# Patient Record
Sex: MALE | Race: WHITE | Employment: UNEMPLOYED | URBAN - METROPOLITAN AREA
[De-identification: names, ages, dates, MRNs, and addresses within clinical notes are randomized per-mention and may not be internally consistent; named-entity substitution may affect disease eponyms.]

---

## 2021-03-05 ENCOUNTER — APPOINTMENT (OUTPATIENT)
Dept: GENERAL RADIOLOGY | Age: 36
End: 2021-03-05
Payer: COMMERCIAL

## 2021-03-05 ENCOUNTER — HOSPITAL ENCOUNTER (EMERGENCY)
Age: 36
Discharge: HOME | End: 2021-03-05
Attending: EMERGENCY MEDICINE
Payer: COMMERCIAL

## 2021-03-05 VITALS
HEART RATE: 94 BPM | WEIGHT: 170 LBS | RESPIRATION RATE: 18 BRPM | SYSTOLIC BLOOD PRESSURE: 127 MMHG | TEMPERATURE: 97 F | BODY MASS INDEX: 24.39 KG/M2 | DIASTOLIC BLOOD PRESSURE: 94 MMHG | OXYGEN SATURATION: 99 %

## 2021-03-05 DIAGNOSIS — T33.90XA FROSTBITE, INITIAL ENCOUNTER: Primary | ICD-10-CM

## 2021-03-05 DIAGNOSIS — Z51.89 VISIT FOR WOUND CHECK: ICD-10-CM

## 2021-03-05 LAB
ANION GAP SERPL CALCULATED.3IONS-SCNC: 11 MMOL/L (ref 7–16)
BASOPHILS ABSOLUTE: 0.07 E9/L (ref 0–0.2)
BASOPHILS RELATIVE PERCENT: 0.7 % (ref 0–2)
BUN BLDV-MCNC: 15 MG/DL (ref 6–20)
CALCIUM SERPL-MCNC: 9.1 MG/DL (ref 8.6–10.2)
CHLORIDE BLD-SCNC: 105 MMOL/L (ref 98–107)
CO2: 24 MMOL/L (ref 22–29)
CREAT SERPL-MCNC: 1 MG/DL (ref 0.7–1.2)
EOSINOPHILS ABSOLUTE: 0.07 E9/L (ref 0.05–0.5)
EOSINOPHILS RELATIVE PERCENT: 0.7 % (ref 0–6)
GFR AFRICAN AMERICAN: >60
GFR NON-AFRICAN AMERICAN: >60 ML/MIN/1.73
GLUCOSE BLD-MCNC: 86 MG/DL (ref 74–99)
HCT VFR BLD CALC: 34.5 % (ref 37–54)
HEMOGLOBIN: 11.5 G/DL (ref 12.5–16.5)
IMMATURE GRANULOCYTES #: 0.03 E9/L
IMMATURE GRANULOCYTES %: 0.3 % (ref 0–5)
LYMPHOCYTES ABSOLUTE: 2.7 E9/L (ref 1.5–4)
LYMPHOCYTES RELATIVE PERCENT: 26.8 % (ref 20–42)
MCH RBC QN AUTO: 31.9 PG (ref 26–35)
MCHC RBC AUTO-ENTMCNC: 33.3 % (ref 32–34.5)
MCV RBC AUTO: 95.6 FL (ref 80–99.9)
MONOCYTES ABSOLUTE: 1.13 E9/L (ref 0.1–0.95)
MONOCYTES RELATIVE PERCENT: 11.2 % (ref 2–12)
NEUTROPHILS ABSOLUTE: 6.09 E9/L (ref 1.8–7.3)
NEUTROPHILS RELATIVE PERCENT: 60.3 % (ref 43–80)
PDW BLD-RTO: 12.4 FL (ref 11.5–15)
PLATELET # BLD: 379 E9/L (ref 130–450)
PMV BLD AUTO: 10 FL (ref 7–12)
POTASSIUM REFLEX MAGNESIUM: 3.9 MMOL/L (ref 3.5–5)
RBC # BLD: 3.61 E12/L (ref 3.8–5.8)
SODIUM BLD-SCNC: 140 MMOL/L (ref 132–146)
TOTAL CK: 260 U/L (ref 20–200)
WBC # BLD: 10.1 E9/L (ref 4.5–11.5)

## 2021-03-05 PROCEDURE — 73130 X-RAY EXAM OF HAND: CPT

## 2021-03-05 PROCEDURE — 80048 BASIC METABOLIC PNL TOTAL CA: CPT

## 2021-03-05 PROCEDURE — 99285 EMERGENCY DEPT VISIT HI MDM: CPT

## 2021-03-05 PROCEDURE — 82550 ASSAY OF CK (CPK): CPT

## 2021-03-05 PROCEDURE — 6370000000 HC RX 637 (ALT 250 FOR IP): Performed by: EMERGENCY MEDICINE

## 2021-03-05 PROCEDURE — 85025 COMPLETE CBC W/AUTO DIFF WBC: CPT

## 2021-03-05 RX ORDER — DIAPER,BRIEF,INFANT-TODD,DISP
EACH MISCELLANEOUS ONCE
Status: COMPLETED | OUTPATIENT
Start: 2021-03-05 | End: 2021-03-05

## 2021-03-05 RX ORDER — HYDROCODONE BITARTRATE AND ACETAMINOPHEN 5; 325 MG/1; MG/1
1 TABLET ORAL ONCE
Status: COMPLETED | OUTPATIENT
Start: 2021-03-05 | End: 2021-03-05

## 2021-03-05 RX ORDER — GABAPENTIN 300 MG/1
300 CAPSULE ORAL ONCE
Status: COMPLETED | OUTPATIENT
Start: 2021-03-05 | End: 2021-03-05

## 2021-03-05 RX ORDER — BACITRACIN, NEOMYCIN, POLYMYXIN B 400; 3.5; 5 [USP'U]/G; MG/G; [USP'U]/G
OINTMENT TOPICAL
Qty: 30 G | Refills: 0 | Status: SHIPPED | OUTPATIENT
Start: 2021-03-05 | End: 2021-03-15

## 2021-03-05 RX ORDER — GABAPENTIN 100 MG/1
100 CAPSULE ORAL 3 TIMES DAILY
Qty: 21 CAPSULE | Refills: 0 | Status: SHIPPED | OUTPATIENT
Start: 2021-03-05 | End: 2021-03-12

## 2021-03-05 RX ADMIN — HYDROCODONE BITARTRATE AND ACETAMINOPHEN 1 TABLET: 5; 325 TABLET ORAL at 16:30

## 2021-03-05 RX ADMIN — BACITRACIN ZINC: 500 OINTMENT TOPICAL at 16:31

## 2021-03-05 RX ADMIN — GABAPENTIN 300 MG: 300 CAPSULE ORAL at 16:31

## 2021-03-05 ASSESSMENT — PAIN DESCRIPTION - DESCRIPTORS: DESCRIPTORS: SHARP;TINGLING

## 2021-03-05 ASSESSMENT — PAIN DESCRIPTION - ORIENTATION: ORIENTATION: RIGHT;LEFT

## 2021-03-05 ASSESSMENT — PAIN DESCRIPTION - PAIN TYPE: TYPE: ACUTE PAIN

## 2021-03-05 ASSESSMENT — PAIN SCALES - GENERAL: PAINLEVEL_OUTOF10: 10

## 2021-03-05 ASSESSMENT — PAIN DESCRIPTION - LOCATION: LOCATION: HAND

## 2021-03-05 NOTE — ED NOTES
Bilateral hands soak in warm/soapy water. ATB ointment applied w non-stick dressings, then wrapped w cling. Patient tolerated well. Patient then taken to x-ray.      Nomi Bautista RN  03/05/21 [id number]

## 2021-03-05 NOTE — ED NOTES
kin Johnson mgr at [facility], [phone].  Contact for updates and transport      Louis Merino RN  03/05/21 [id number]

## 2021-03-07 ASSESSMENT — ENCOUNTER SYMPTOMS
NAUSEA: 0
EYE REDNESS: 0
VOMITING: 0
ABDOMINAL PAIN: 0
SHORTNESS OF BREATH: 0

## 2021-03-07 NOTE — ED PROVIDER NOTES
Chief complaint: Hand pain      HPI:  3/7/21, Time: 12:23 AM EST    HPI             Caren Mullins is a 38 y.o. male presenting to the ED for hip pain. The history is obtained from the patient as well as the patient's medical record. The patient was recently treated for frostbite at [facility]. The patient was discharged to a [facility] house. The patient states that he was discharged with antibiotic ointment, gauze and was to be on gabapentin and Tylenol for pain. The patient states that he is experiencing extreme pain which she describes a burning sensation of his bilateral fingers. This is currently rated 10/10. Worse with palpation use of his fingers. No alleviating factors. No treatment prior to arrival.  The patient states that he feels this was exacerbated by getting over-the-counter antibiotic ointment from the [facility]. There are no associated fevers, chills, lightheadedness, nasal congestion, chest pain, shortness of breath, cough, nausea, vomiting, abdominal pain, diarrhea, constipation, dysuria or hematuria. The patient has no other stated complaints at this time. ROS:   Review of Systems   Constitutional: Negative for chills and fever. HENT: Negative for congestion. Eyes: Negative for redness. Respiratory: Negative for shortness of breath. Cardiovascular: Negative for chest pain. Gastrointestinal: Negative for abdominal pain, nausea and vomiting. Genitourinary: Negative for dysuria. Musculoskeletal: Negative for arthralgias. Skin: Positive for wound (Bilateral hands). Negative for rash. Neurological: Negative for light-headedness. Psychiatric/Behavioral: Negative for confusion. All other systems reviewed and are negative.      --------------------------------------------- PAST HISTORY ---------------------------------------------  Past Medical History:  has no past medical history on file.     Past Surgical History:  has no past surgical history on file.    Social History:  reports that he has been smoking. He has never used smokeless tobacco. He reports that he does not drink alcohol. Family History: family history is not on file. The patients home medications have been reviewed. Allergies: Sulfa antibiotics    ---------------------------------------------------PHYSICAL EXAM--------------------------------------      Constitutional/General: Alert and oriented x3, well appearing, non toxic in NAD  Head: Normocephalic and atraumatic  Mouth: Oropharynx clear, handling secretions, no trismus  Neck: Supple, full ROM,  Pulmonary: Lungs clear to auscultation bilaterally, no wheezes, rales, or rhonchi. Not in respiratory distress  Cardiovascular:  Regular rate. Regular rhythm. No murmurs  Chest: no chest wall tenderness  Abdomen: Soft. Non tender. Non distended. No rebound, guarding, or rigidity. No pulsatile masses appreciated. Musculoskeletal: Moves all extremities x 4. Warm and well perfused, no cyanosis. The patient does have healing wounds and sloughing of his fingertips diffusely. There is no necrotic areas seen. Skin: warm and dry. The patient does have healing wounds and sloughing of his fingertips diffusely. There is no necrotic areas seen. There is no erythema, warm to touch or cellulitic changes  Neurologic: GCS 15, no gross focal neurologic deficits  Psych: Normal Affect    -------------------------------------------------- RESULTS -------------------------------------------------  I have personally reviewed all laboratory and imaging results for this patient. Results are listed below.      LABS:  Results for orders placed or performed during the hospital encounter of 03/05/21   CBC Auto Differential   Result Value Ref Range    WBC 10.1 4.5 - 11.5 E9/L    RBC 3.61 (L) 3.80 - 5.80 E12/L    Hemoglobin 11.5 (L) 12.5 - 16.5 g/dL    Hematocrit 34.5 (L) 37.0 - 54.0 %    MCV 95.6 80.0 - 99.9 fL    MCH 31.9 26.0 - 35.0 pg    MCHC 33.3 32.0 - 34.5 %    RDW 12.4 11.5 - 15.0 fL    Platelets 922 496 - 868 E9/L    MPV 10.0 7.0 - 12.0 fL    Neutrophils % 60.3 43.0 - 80.0 %    Immature Granulocytes % 0.3 0.0 - 5.0 %    Lymphocytes % 26.8 20.0 - 42.0 %    Monocytes % 11.2 2.0 - 12.0 %    Eosinophils % 0.7 0.0 - 6.0 %    Basophils % 0.7 0.0 - 2.0 %    Neutrophils Absolute 6.09 1.80 - 7.30 E9/L    Immature Granulocytes # 0.03 E9/L    Lymphocytes Absolute 2.70 1.50 - 4.00 E9/L    Monocytes Absolute 1.13 (H) 0.10 - 0.95 E9/L    Eosinophils Absolute 0.07 0.05 - 0.50 E9/L    Basophils Absolute 0.07 0.00 - 0.20 B9/S   Basic Metabolic Panel w/ Reflex to MG   Result Value Ref Range    Sodium 140 132 - 146 mmol/L    Potassium reflex Magnesium 3.9 3.5 - 5.0 mmol/L    Chloride 105 98 - 107 mmol/L    CO2 24 22 - 29 mmol/L    Anion Gap 11 7 - 16 mmol/L    Glucose 86 74 - 99 mg/dL    BUN 15 6 - 20 mg/dL    CREATININE 1.0 0.7 - 1.2 mg/dL    GFR Non-African American >60 >=60 mL/min/1.73    GFR African American >60     Calcium 9.1 8.6 - 10.2 mg/dL   CK   Result Value Ref Range    Total  (H) 20 - 200 U/L       RADIOLOGY:  Interpreted by Radiologist.  XR HAND LEFT (MIN 3 VIEWS)   Final Result   1. No acute osseous findings in either hand. 2.  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      3.  Foreshortening and bowing of the right hand 4th and 5th digit metacarpal   bones. Finding either congenital or chronic posttraumatic. XR HAND RIGHT (MIN 3 VIEWS)   Final Result   1. No acute osseous findings in either hand. 2.  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      3.  Foreshortening and bowing of the right hand 4th and 5th digit metacarpal   bones. Finding either congenital or chronic posttraumatic.                   ------------------------- NURSING NOTES AND VITALS REVIEWED ---------------------------   The nursing notes within the ED encounter and vital signs as below have been reviewed by myself.   BP (!) 127/94   Pulse 94   Temp 97 °F (36.1 °C)   Resp 18   Wt 170 lb (77.1 kg)   SpO2 99%   BMI 24.39 kg/m²   Oxygen Saturation Interpretation: Normal    The patients available past medical records and past encounters were reviewed. ------------------------------ ED COURSE/MEDICAL DECISION MAKING----------------------  Medications   HYDROcodone-acetaminophen (NORCO) 5-325 MG per tablet 1 tablet (1 tablet Oral Given 3/5/21 1630)   gabapentin (NEURONTIN) capsule 300 mg (300 mg Oral Given 3/5/21 1631)   bacitracin zinc ointment ( Topical Given 3/5/21 1631)             Medical Decision Making:   I, Dr. Chrissy Polo am the primary physician of record. Anselmo Quintana is a 38 y.o. male who presents to the ED for wound check. Differential diagnosis includes but is not limited to neuropathic pain secondary to frostbite, cellulitis, osteonecrosis the patient does have a past medical history of   has no past medical history on file. . The patient was given bacitracin ointment, gabapentin and Norco. Labs and imaging obtained, reviewed. Patient treated symptomatically. Results discussed with patient. Patient did have CBC which was unremarkable, BMP unremarkable, CK unremarkable, bilateral hand x-ray unremarkable for any acute process. Patient was treated symptomatically. I did review the patient's records from [facility].  The patient will be discharged home and follow-up outpatient. Re-Evaluations/Consultations:             ED Course as of Mar 07 0028   Fri Mar 05, 2021   1738 The patient is in the bed no acute distress. Results discussed. [MT]      ED Course User Index  [MT] Kerri Middleton DO               This patient's ED course included: History, physical examination, reevaluation prior to disposition, labs, imaging, wound care  This patient has remained hemodynamically stable during their ED course. Counseling:    The emergency provider has spoken with the patient and discussed todays results, in addition to providing specific details for the plan of care and counseling regarding the diagnosis and prognosis. Questions are answered at this time and they are agreeable with the plan.       --------------------------------- IMPRESSION AND DISPOSITION ---------------------------------    IMPRESSION  1. Frostbite, initial encounter    2. Visit for wound check        DISPOSITION  Disposition: Discharge to home  Patient condition is stable        NOTE: This report was transcribed using voice recognition software.  Every effort was made to ensure accuracy; however, inadvertent computerized transcription errors may be present         Marcos Leslie DO  03/07/21 0028